# Patient Record
Sex: MALE | Race: BLACK OR AFRICAN AMERICAN
[De-identification: names, ages, dates, MRNs, and addresses within clinical notes are randomized per-mention and may not be internally consistent; named-entity substitution may affect disease eponyms.]

---

## 2020-11-07 ENCOUNTER — HOSPITAL ENCOUNTER (EMERGENCY)
Dept: HOSPITAL 61 - ER | Age: 35
Discharge: HOME | End: 2020-11-07
Payer: SELF-PAY

## 2020-11-07 VITALS — WEIGHT: 169.76 LBS | HEIGHT: 76 IN | BODY MASS INDEX: 20.67 KG/M2

## 2020-11-07 VITALS — SYSTOLIC BLOOD PRESSURE: 141 MMHG | DIASTOLIC BLOOD PRESSURE: 89 MMHG

## 2020-11-07 DIAGNOSIS — Z20.2: ICD-10-CM

## 2020-11-07 DIAGNOSIS — N45.1: Primary | ICD-10-CM

## 2020-11-07 LAB
APTT PPP: YELLOW S
BACTERIA #/AREA URNS HPF: 0 /HPF
BILIRUB UR QL STRIP: NEGATIVE
FIBRINOGEN PPP-MCNC: CLEAR MG/DL
NITRITE UR QL STRIP: NEGATIVE
PH UR STRIP: 6.5 [PH]
PROT UR STRIP-MCNC: NEGATIVE MG/DL
RBC #/AREA URNS HPF: 0 /HPF (ref 0–2)
UROBILINOGEN UR-MCNC: 1 MG/DL
WBC #/AREA URNS HPF: (no result) /HPF (ref 0–4)

## 2020-11-07 PROCEDURE — 99283 EMERGENCY DEPT VISIT LOW MDM: CPT

## 2020-11-07 PROCEDURE — 81001 URINALYSIS AUTO W/SCOPE: CPT

## 2020-11-07 PROCEDURE — 96372 THER/PROPH/DIAG INJ SC/IM: CPT

## 2020-11-07 PROCEDURE — 87086 URINE CULTURE/COLONY COUNT: CPT

## 2020-11-07 PROCEDURE — 87491 CHLMYD TRACH DNA AMP PROBE: CPT

## 2020-11-07 PROCEDURE — 87591 N.GONORRHOEAE DNA AMP PROB: CPT

## 2020-11-07 NOTE — ED.ADGEN
Past Medical History


Past Medical History:  No Pertinent History


Past Surgical History:  No Surgical History


Smoking Status:  Never Smoker


Alcohol Use:  None





General Adult


EDM:


Chief Complaint:  GROIN PAIN





HPI:


HPI:





Patient is a 35  year old AA male who presents emergency department with 

complaints of bilateral testicle pain after having a firm bowel movement this 

morning.  Patient states the pain in his testicles has since subsided, he 

currently has no pain in his testicles.  He states he has had a stinging 

sensation and abnormal penile discharge since yesterday.  Patient reports that 

the last time he had the symptoms he had a urinary tract infection.  He denies 

any nausea, vomiting, diarrhea, abdominal pain, fever, cough, chest pain, 

shortness of breath, palpitations, or headache.  He denies any known exposure to

sexually transmitted infections.  Patient denies any hematuria, incontinence, or

difficulty voiding.  He currently rates his discomfort a 5 out of 10 on the pain

scale, he denies any alleviating factors.  The pain is worse when he urinates.





Review of Systems:


Review of Systems:


Complete ROS is negative unless otherwise noted in HPI.





Current Medications:





Current Medications








 Medications


  (Trade)  Dose


 Ordered  Sig/Arik  Start Time


 Stop Time Status Last Admin


Dose Admin


 


 Azithromycin


  (Zithromax)  1,000 mg  1X  ONCE  11/7/20 17:30


 11/7/20 17:33 DC  





 


 Ceftriaxone Sodium


  (Rocephin Im)  250 mg  1X  ONCE  11/7/20 17:30


 11/7/20 17:33 DC  














Allergies:


Allergies:





Allergies








Coded Allergies Type Severity Reaction Last Updated Verified


 


  No Known Drug Allergies    11/7/20 No











Physical Exam:


PE:


See Above


Constitutional: Well developed, well nourished, no acute distress, non-toxic 

appearance. []


HENT: Normocephalic, atraumatic, bilateral external ears normal, nose normal. []


Eyes: PERRLA, EOMI, conjunctiva normal, no discharge. [] 


Neck: Normal range of motion, no stridor. [] 


Cardiovascular:Heart rate regular rhythm


Lungs & Thorax:  Respirations even and unlabored, no retractions, no respiratory

 distress


Abdomen: soft, no tenderness


: Purulent discharge at the urethral meatus, bilateral testicles are nontender

 without erythema, swelling, or warmth; no palpable enlarged lymph nodes in 

bilateral groins


Skin: Warm, dry, no erythema, no rash. [] 


Extremities: No cyanosis, ROM intact, no edema. [] 


Neurologic: Alert and oriented X 3, no focal deficits noted. []


Psychologic: Affect normal, judgement normal, mood normal. []





Current Patient Data:


Labs:





                                Laboratory Tests








Test


 11/7/20


16:05


 


Urine Collection Type Unknown  


 


Urine Color Yellow  


 


Urine Clarity Clear  


 


Urine pH


 6.5 (<5.0-8.0)





 


Urine Specific Gravity


 1.020


(1.000-1.030)


 


Urine Protein


 Negative mg/dL


(NEG-TRACE)


 


Urine Glucose (UA)


 Negative mg/dL


(NEG)


 


Urine Ketones (Stick)


 Negative mg/dL


(NEG)


 


Urine Blood


 Negative (NEG)





 


Urine Nitrite


 Negative (NEG)





 


Urine Bilirubin


 Negative (NEG)





 


Urine Urobilinogen Dipstick


 1.0 mg/dL (0.2


mg/dL)


 


Urine Leukocyte Esterase


 Moderate (NEG)





 


Urine RBC 0 /HPF (0-2)  


 


Urine WBC


 20-40 /HPF


(0-4)


 


Urine Bacteria


 0 /HPF (0-FEW)





 


Urine Mucus Mod /LPF  








Vital Signs:





                                   Vital Signs








  Date Time  Temp Pulse Resp B/P (MAP) Pulse Ox O2 Delivery O2 Flow Rate FiO2


 


11/7/20 15:30 97.7 89 18 141/89 (106) 98 Room Air  





 97.7       











EKG:


EKG:


[]





Heart Score:


Risk Factors:


Risk Factors:  DM, Current or recent (<one month) smoker, HTN, HLP, family 

history of CAD, obesity.


Risk Scores:


Score 0 - 3:  2.5% MACE over next 6 weeks - Discharge Home


Score 4 - 6:  20.3% MACE over next 6 weeks - Admit for Clinical Observation


Score 7 - 10:  72.7% MACE over next 6 weeks - Early Invasive Strategies





Radiology/Procedures:


Radiology/Procedures:


[]





Course & Med Decision Making:


Course & Med Decision Making


Pertinent Labs and Imaging studies reviewed. (See chart for details)





Patient was treated prophylactically with 250 mg of IM Rocephin, and 1 g of PO 

Zithromax. Patient was instructed to avoid having intercourse until the results 

of gonorrhea and chlamydia testing are available, patient was notified that 

these results would not be available for 48 hours. If one or both of these tests

 is positive, patient needs to refrain from intercourse for approximately 1 week

 following the treatment of any current partners.


[]





Dragon Disclaimer:


Dragon Disclaimer:


This electronic medical record was generated, in whole or in part, using a voice

 recognition dictation system.





Departure


Departure


Impression:  


   Primary Impression:  


   Contact with and (suspected) exposure to infections with a predominantly 

   sexual mode of transmission


   Additional Impression:  


   Epididymitis


Disposition:  01 DC HOME SELF CARE/HOMELESS


Condition:  STABLE


Referrals:  


NO PCP (PCP)


Patient Instructions:  Epididymitis, Sexually Transmitted Disease, Easy-to-Read





Additional Instructions:  


Recommend that you go to your local health department for comprehensive sexually

 transmitted disease testing. You have been treated for a suspected gonorrhea 

and chlamydia. Avoid having intercourse until the results of gonorrhea and 

chlamydia testing are available, these results will not be available for 48 

hours. If one or both of these tests is positive, you need to refrain from 

intercourse for approximately 1 week following the treatment of any current 

partners. Follow-up with your primary care doctor if symptoms persist, return to

 ER symptoms worsen.





Problem Qualifiers











LANIE PAIZ        Nov 7, 2020 18:00

## 2020-12-04 ENCOUNTER — HOSPITAL ENCOUNTER (EMERGENCY)
Dept: HOSPITAL 61 - ER | Age: 35
Discharge: HOME | End: 2020-12-04
Payer: SELF-PAY

## 2020-12-04 VITALS — HEIGHT: 76 IN | WEIGHT: 180.34 LBS | BODY MASS INDEX: 21.96 KG/M2

## 2020-12-04 VITALS — DIASTOLIC BLOOD PRESSURE: 80 MMHG | SYSTOLIC BLOOD PRESSURE: 123 MMHG

## 2020-12-04 DIAGNOSIS — R36.9: Primary | ICD-10-CM

## 2020-12-04 DIAGNOSIS — Z20.2: ICD-10-CM

## 2020-12-04 LAB
APTT PPP: YELLOW S
BACTERIA #/AREA URNS HPF: 0 /HPF
BILIRUB UR QL STRIP: NEGATIVE
FIBRINOGEN PPP-MCNC: CLEAR MG/DL
NITRITE UR QL STRIP: NEGATIVE
PH UR STRIP: 6.5 [PH]
PROT UR STRIP-MCNC: NEGATIVE MG/DL
RBC #/AREA URNS HPF: 0 /HPF (ref 0–2)
UROBILINOGEN UR-MCNC: 0.2 MG/DL
WBC #/AREA URNS HPF: >40 /HPF (ref 0–4)

## 2020-12-04 PROCEDURE — 81001 URINALYSIS AUTO W/SCOPE: CPT

## 2020-12-04 PROCEDURE — 96372 THER/PROPH/DIAG INJ SC/IM: CPT

## 2020-12-04 PROCEDURE — 87491 CHLMYD TRACH DNA AMP PROBE: CPT

## 2020-12-04 PROCEDURE — 87086 URINE CULTURE/COLONY COUNT: CPT

## 2020-12-04 PROCEDURE — 99283 EMERGENCY DEPT VISIT LOW MDM: CPT

## 2020-12-04 PROCEDURE — 87591 N.GONORRHOEAE DNA AMP PROB: CPT
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
## 2021-09-14 ENCOUNTER — HOSPITAL ENCOUNTER (EMERGENCY)
Dept: HOSPITAL 61 - ER | Age: 36
Discharge: HOME | End: 2021-09-14
Payer: SELF-PAY

## 2021-09-14 VITALS — DIASTOLIC BLOOD PRESSURE: 70 MMHG | SYSTOLIC BLOOD PRESSURE: 140 MMHG

## 2021-09-14 VITALS — BODY MASS INDEX: 23.92 KG/M2 | WEIGHT: 202.6 LBS | HEIGHT: 77 IN

## 2021-09-14 DIAGNOSIS — Z91.018: ICD-10-CM

## 2021-09-14 DIAGNOSIS — Z87.891: ICD-10-CM

## 2021-09-14 DIAGNOSIS — H00.036: Primary | ICD-10-CM

## 2021-09-14 PROCEDURE — 99283 EMERGENCY DEPT VISIT LOW MDM: CPT
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
## 2022-05-26 ENCOUNTER — HOSPITAL ENCOUNTER (EMERGENCY)
Dept: HOSPITAL 61 - ER | Age: 37
Discharge: HOME | End: 2022-05-26
Payer: MEDICAID

## 2022-05-26 VITALS — SYSTOLIC BLOOD PRESSURE: 135 MMHG | DIASTOLIC BLOOD PRESSURE: 82 MMHG

## 2022-05-26 VITALS — HEIGHT: 77 IN | BODY MASS INDEX: 21.11 KG/M2 | WEIGHT: 178.79 LBS

## 2022-05-26 DIAGNOSIS — Z91.018: ICD-10-CM

## 2022-05-26 DIAGNOSIS — R10.9: Primary | ICD-10-CM

## 2022-05-26 DIAGNOSIS — R11.2: ICD-10-CM

## 2022-05-26 DIAGNOSIS — Z87.891: ICD-10-CM

## 2022-05-26 LAB
ALBUMIN SERPL-MCNC: 4 G/DL (ref 3.4–5)
ALBUMIN/GLOB SERPL: 1.2 {RATIO} (ref 1–1.7)
ALP SERPL-CCNC: 60 U/L (ref 46–116)
ALT SERPL-CCNC: 41 U/L (ref 16–63)
ANION GAP SERPL CALC-SCNC: 10 MMOL/L (ref 6–14)
AST SERPL-CCNC: 36 U/L (ref 15–37)
BACTERIA #/AREA URNS HPF: 0 /HPF
BASOPHILS # BLD AUTO: 0 X10^3/UL (ref 0–0.2)
BASOPHILS NFR BLD: 0 % (ref 0–3)
BILIRUB SERPL-MCNC: 1.1 MG/DL (ref 0.2–1)
BUN SERPL-MCNC: 15 MG/DL (ref 8–26)
BUN/CREAT SERPL: 13 (ref 6–20)
CALCIUM SERPL-MCNC: 9.3 MG/DL (ref 8.5–10.1)
CHLORIDE SERPL-SCNC: 105 MMOL/L (ref 98–107)
CO2 SERPL-SCNC: 26 MMOL/L (ref 21–32)
CREAT SERPL-MCNC: 1.2 MG/DL (ref 0.7–1.3)
EOSINOPHIL NFR BLD: 0 % (ref 0–3)
EOSINOPHIL NFR BLD: 0 X10^3/UL (ref 0–0.7)
ERYTHROCYTE [DISTWIDTH] IN BLOOD BY AUTOMATED COUNT: 13.8 % (ref 11.5–14.5)
GFR SERPLBLD BASED ON 1.73 SQ M-ARVRAT: 82.9 ML/MIN
GLUCOSE SERPL-MCNC: 124 MG/DL (ref 70–99)
HCT VFR BLD CALC: 41.7 % (ref 39–53)
HGB BLD-MCNC: 13.8 G/DL (ref 13–17.5)
LIPASE: 72 U/L (ref 73–393)
LYMPHOCYTES # BLD: 1.2 X10^3/UL (ref 1–4.8)
LYMPHOCYTES NFR BLD AUTO: 12 % (ref 24–48)
MCH RBC QN AUTO: 27 PG (ref 25–35)
MCHC RBC AUTO-ENTMCNC: 33 G/DL (ref 31–37)
MCV RBC AUTO: 82 FL (ref 79–100)
MONO #: 0.3 X10^3/UL (ref 0–1.1)
MONOCYTES NFR BLD: 3 % (ref 0–9)
NEUT #: 8.9 X10^3/UL (ref 1.8–7.7)
NEUTROPHILS NFR BLD AUTO: 85 % (ref 31–73)
PLATELET # BLD AUTO: 298 X10^3/UL (ref 140–400)
POTASSIUM SERPL-SCNC: 4.1 MMOL/L (ref 3.5–5.1)
PROT SERPL-MCNC: 7.4 G/DL (ref 6.4–8.2)
RBC # BLD AUTO: 5.08 X10^6/UL (ref 4.3–5.7)
RBC #/AREA URNS HPF: (no result) /HPF (ref 0–2)
SODIUM SERPL-SCNC: 141 MMOL/L (ref 136–145)
WBC # BLD AUTO: 10.5 X10^3/UL (ref 4–11)
WBC #/AREA URNS HPF: (no result) /HPF (ref 0–4)

## 2022-05-26 PROCEDURE — 96374 THER/PROPH/DIAG INJ IV PUSH: CPT

## 2022-05-26 PROCEDURE — 85025 COMPLETE CBC W/AUTO DIFF WBC: CPT

## 2022-05-26 PROCEDURE — 36415 COLL VENOUS BLD VENIPUNCTURE: CPT

## 2022-05-26 PROCEDURE — 81001 URINALYSIS AUTO W/SCOPE: CPT

## 2022-05-26 PROCEDURE — 80053 COMPREHEN METABOLIC PANEL: CPT

## 2022-05-26 PROCEDURE — 83690 ASSAY OF LIPASE: CPT

## 2022-05-26 PROCEDURE — 99284 EMERGENCY DEPT VISIT MOD MDM: CPT

## 2022-05-26 PROCEDURE — 96361 HYDRATE IV INFUSION ADD-ON: CPT

## 2022-05-26 PROCEDURE — 96375 TX/PRO/DX INJ NEW DRUG ADDON: CPT
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
